# Patient Record
Sex: FEMALE | Race: WHITE | NOT HISPANIC OR LATINO | Employment: FULL TIME | URBAN - METROPOLITAN AREA
[De-identification: names, ages, dates, MRNs, and addresses within clinical notes are randomized per-mention and may not be internally consistent; named-entity substitution may affect disease eponyms.]

---

## 2022-10-21 ENCOUNTER — NURSE TRIAGE (OUTPATIENT)
Dept: ADMINISTRATIVE | Facility: CLINIC | Age: 27
End: 2022-10-21

## 2022-10-21 NOTE — TELEPHONE ENCOUNTER
"OOC outgoing call -       Pt c/o possible staph infection in nose, yellow and discolored in nose, lymph node on left side of face is really swollen, left side nose extremely painful and feels a pulse in left side of face, dizziness, 20 weeks pregnant, slightly fast hr per pt. Face pain protocol followed and pt advised to call 911 now. Pt refuses and will "go to the ER." Education provided on septic shock and is considered a medical emergency and to call 911 now is what is recommended using dr derived protocol. Pt states she understands. No [facility] pcp listed to route encounter to at this time.     Reason for Disposition   Shock suspected (e.g., cold/pale/clammy skin, too weak to stand, low BP, rapid pulse)    Protocols used: Face Pain-A-EZRA    "